# Patient Record
Sex: FEMALE | Race: ASIAN | Employment: FULL TIME | URBAN - METROPOLITAN AREA
[De-identification: names, ages, dates, MRNs, and addresses within clinical notes are randomized per-mention and may not be internally consistent; named-entity substitution may affect disease eponyms.]

---

## 2017-08-07 ENCOUNTER — HOSPITAL ENCOUNTER (OUTPATIENT)
Dept: PREADMISSION TESTING | Age: 42
Discharge: HOME | End: 2017-08-07
Payer: SELF-PAY

## 2017-08-07 VITALS
TEMPERATURE: 98.3 F | DIASTOLIC BLOOD PRESSURE: 67 MMHG | RESPIRATION RATE: 17 BRPM | SYSTOLIC BLOOD PRESSURE: 122 MMHG | BODY MASS INDEX: 19.84 KG/M2 | HEART RATE: 53 BPM | HEIGHT: 64 IN | OXYGEN SATURATION: 100 % | WEIGHT: 116.18 LBS

## 2017-08-07 LAB
ALBUMIN SERPL BCP-MCNC: 3.8 G/DL (ref 3.5–5)
ALBUMIN/GLOB SERPL: 1.2 {RATIO} (ref 1.1–2.2)
ALP SERPL-CCNC: 70 U/L (ref 45–117)
ALT SERPL-CCNC: 46 U/L (ref 12–78)
ANION GAP BLD CALC-SCNC: 8 MMOL/L (ref 5–15)
APTT PPP: 24.3 SEC (ref 22.1–32.5)
AST SERPL W P-5'-P-CCNC: 31 U/L (ref 15–37)
ATRIAL RATE: 47 BPM
BASOPHILS # BLD AUTO: 0 K/UL (ref 0–0.1)
BASOPHILS # BLD: 1 % (ref 0–1)
BILIRUB SERPL-MCNC: 0.3 MG/DL (ref 0.2–1)
BUN SERPL-MCNC: 10 MG/DL (ref 6–20)
BUN/CREAT SERPL: 14 (ref 12–20)
CALCIUM SERPL-MCNC: 8.5 MG/DL (ref 8.5–10.1)
CALCULATED P AXIS, ECG09: 51 DEGREES
CALCULATED R AXIS, ECG10: 41 DEGREES
CALCULATED T AXIS, ECG11: 41 DEGREES
CHLORIDE SERPL-SCNC: 103 MMOL/L (ref 97–108)
CO2 SERPL-SCNC: 30 MMOL/L (ref 21–32)
CREAT SERPL-MCNC: 0.7 MG/DL (ref 0.55–1.02)
DIAGNOSIS, 93000: NORMAL
EOSINOPHIL # BLD: 0.1 K/UL (ref 0–0.4)
EOSINOPHIL NFR BLD: 2 % (ref 0–7)
ERYTHROCYTE [DISTWIDTH] IN BLOOD BY AUTOMATED COUNT: 14.1 % (ref 11.5–14.5)
GLOBULIN SER CALC-MCNC: 3.3 G/DL (ref 2–4)
GLUCOSE SERPL-MCNC: 104 MG/DL (ref 65–100)
HCT VFR BLD AUTO: 34.5 % (ref 35–47)
HGB BLD-MCNC: 10.8 G/DL (ref 11.5–16)
INR PPP: 1 (ref 0.9–1.1)
LYMPHOCYTES # BLD AUTO: 36 % (ref 12–49)
LYMPHOCYTES # BLD: 2.4 K/UL (ref 0.8–3.5)
MCH RBC QN AUTO: 26.1 PG (ref 26–34)
MCHC RBC AUTO-ENTMCNC: 31.3 G/DL (ref 30–36.5)
MCV RBC AUTO: 83.3 FL (ref 80–99)
MONOCYTES # BLD: 0.4 K/UL (ref 0–1)
MONOCYTES NFR BLD AUTO: 6 % (ref 5–13)
NEUTS SEG # BLD: 3.7 K/UL (ref 1.8–8)
NEUTS SEG NFR BLD AUTO: 55 % (ref 32–75)
P-R INTERVAL, ECG05: 114 MS
PLATELET # BLD AUTO: 203 K/UL (ref 150–400)
POTASSIUM SERPL-SCNC: 4.3 MMOL/L (ref 3.5–5.1)
PROT SERPL-MCNC: 7.1 G/DL (ref 6.4–8.2)
PROTHROMBIN TIME: 9.9 SEC (ref 9–11.1)
Q-T INTERVAL, ECG07: 456 MS
QRS DURATION, ECG06: 82 MS
QTC CALCULATION (BEZET), ECG08: 403 MS
RBC # BLD AUTO: 4.14 M/UL (ref 3.8–5.2)
SODIUM SERPL-SCNC: 141 MMOL/L (ref 136–145)
THERAPEUTIC RANGE,PTTT: NORMAL SECS (ref 58–77)
VENTRICULAR RATE, ECG03: 47 BPM
WBC # BLD AUTO: 6.6 K/UL (ref 3.6–11)

## 2017-08-07 PROCEDURE — 80053 COMPREHEN METABOLIC PANEL: CPT | Performed by: SURGERY

## 2017-08-07 PROCEDURE — 85730 THROMBOPLASTIN TIME PARTIAL: CPT | Performed by: SURGERY

## 2017-08-07 PROCEDURE — 85025 COMPLETE CBC W/AUTO DIFF WBC: CPT | Performed by: SURGERY

## 2017-08-07 PROCEDURE — 85610 PROTHROMBIN TIME: CPT | Performed by: SURGERY

## 2017-08-07 PROCEDURE — 36415 COLL VENOUS BLD VENIPUNCTURE: CPT | Performed by: SURGERY

## 2017-08-07 PROCEDURE — 93005 ELECTROCARDIOGRAM TRACING: CPT

## 2017-08-07 RX ORDER — IBUPROFEN 200 MG
200 TABLET ORAL
COMMUNITY
End: 2017-08-24

## 2017-08-07 NOTE — PERIOP NOTES
[facility]  PREOPERATIVE INSTRUCTIONS    Surgery Date:   Thursday, 8/24/17. Surgery arrival time given by surgeon: NO  (If [facility] staff will call you between 3pm - 7pm the day before surgery with your arrival time. If your surgery is on a Monday, we will call you the preceding Friday. Please call [phone] after 7pm if you did not receive your arrival time.) Verification phone call on Wednesday, 8/23/17. 1. Report  to the [address] [facility] on the day of your surgery. Bring your insurance card, photo identification, and any copayment (if applicable). 2. You must have a responsible adult to drive you home and stay with you the first 24 hours after surgery if you are going home the same day of your surgery. 3. Nothing to eat or drink after midnight the night before surgery. This means NO water, gum, mints, coffee, juice, etc.    4. MEDICATIONS TO TAKE THE MORNING OF SURGERY WITH A SIP OF WATER:None. 5. No alcoholic beverages 24 hours before and after your surgery. 6. If you are being admitted to the hospital,please leave personal belongings/luggage in your car until you have an assigned hospital room number. ( The hospital discharge time is 12 PM NOON. Your adult  should be at the hospital prior to the noon discharge time unless otherwise instructed.)   7. STOP Aspirin and/or any non-steroidal anti-inflammatory drugs (i.e. Ibuprofen, Naproxen, Advil, Aleve) as directed by your surgeon. You may take Tylenol. Stop herbal supplements 1 week prior to  surgery. 8. If you are currently taking Plavix, Coumadin,or any other blood-thinning/ anticoagulant medication contact your surgeon for instructions. 9. Wear comfortable clothes. Wear your glasses instead of contacts. Please leave all money, jewelry and valuables at home. No make up, particularly mascara, the day of surgery. 10.  REMOVE ALL body piercings, rings,and jewelry and leave at home.   Wear your hair loose or down, no pony-tails, buns, or any metal hair clips. 11. If you shower the morning of surgery, please do not apply any lotions, powders, or deodorants afterwards. Do not shave any body area within 24 hours of your surgery. 12. Please follow all instructions to avoid any potential surgical cancellation. 13. Should your physical condition change, (i.e. fever, cold, flu, etc.) please notify your surgeon as soon as possible. 14. It is important to be on time. If a situation occurs where you may be delayed, please call:  [phone] / [phone] on the day of surgery. 15. The [facility] staff can be reached at [phone]. 16. Special instructions: Free  parking. Bring completed medication form on day of surgery. 17. The patient was contacted  in person. She  verbalize  understanding of all instructions does not  need reinforcement.

## 2017-08-22 NOTE — H&P
Surgery History and Physical    Subjective:      Cayetano Primrose is a 36 y.o. female who presents for bilateral breast augmentation. No past medical history on file. Past Surgical History:   Procedure Laterality Date    HX APPENDECTOMY  2004    HX TUBAL LIGATION  2001      No family history on file. Social History   Substance Use Topics    Smoking status: Never Smoker    Smokeless tobacco: Never Used    Alcohol use 0.6 oz/week     1 Glasses of wine per week      Comment: socially      Prior to Admission medications    Medication Sig Start Date End Date Taking? Authorizing Provider   ibuprofen (ADVIL) 200 mg tablet Take 200 mg by mouth every eight (8) hours as needed for Pain. Historical Provider      No Known Allergies    Review of Systems:  Cardiovascular: negative  Gastrointestinal: negative  Integument/Breast: negative    Objective:      No data found. No data recorded. Physical Exam:  GENERAL: alert, cooperative, no distress, appears stated age  LUNG: clear to auscultation bilaterally  HEART: regular rate and rhythm, S1, S2 normal, no murmur, click, rub or gallop  ABDOMEN: soft, non-tender. Bowel sounds normal. No masses,  no organomegaly  NEUROLOGIC: negative    Assessment: This is a 37 yo female who presents for bilateral breast augmentation. Plan:     Discussed the risk of surgery including bleeding and infection,  and the risks of general anesthetic. The patient understands the risks; any and all questions were answered to the patient's satisfaction.     Signed By: Issac Stanley PA-C     August 22, 2017

## 2017-08-23 ENCOUNTER — ANESTHESIA EVENT (OUTPATIENT)
Dept: SURGERY | Age: 42
End: 2017-08-23
Payer: SELF-PAY

## 2017-08-24 ENCOUNTER — HOSPITAL ENCOUNTER (OUTPATIENT)
Age: 42
Setting detail: OUTPATIENT SURGERY
Discharge: HOME | End: 2017-08-24
Attending: SURGERY | Admitting: SURGERY
Payer: SELF-PAY

## 2017-08-24 ENCOUNTER — ANESTHESIA (OUTPATIENT)
Dept: SURGERY | Age: 42
End: 2017-08-24
Payer: SELF-PAY

## 2017-08-24 VITALS
TEMPERATURE: 98 F | DIASTOLIC BLOOD PRESSURE: 74 MMHG | RESPIRATION RATE: 15 BRPM | SYSTOLIC BLOOD PRESSURE: 118 MMHG | OXYGEN SATURATION: 99 % | HEART RATE: 56 BPM

## 2017-08-24 LAB — HCG UR QL: NEGATIVE

## 2017-08-24 PROCEDURE — 77030010507 HC ADH SKN DERMBND J&J -B: Performed by: SURGERY

## 2017-08-24 PROCEDURE — 77030016570 HC BLNKT BAIR HGGR 3M -B: Performed by: SURGERY

## 2017-08-24 PROCEDURE — 81025 URINE PREGNANCY TEST: CPT

## 2017-08-24 PROCEDURE — 74011250636 HC RX REV CODE- 250/636: Performed by: ANESTHESIOLOGY

## 2017-08-24 PROCEDURE — 77030018836 HC SOL IRR NACL ICUM -A: Performed by: SURGERY

## 2017-08-24 PROCEDURE — 76210000006 HC OR PH I REC 0.5 TO 1 HR: Performed by: SURGERY

## 2017-08-24 PROCEDURE — 77030002966 HC SUT PDS J&J -A: Performed by: SURGERY

## 2017-08-24 PROCEDURE — 77030008684 HC TU ET CUF COVD -B: Performed by: NURSE ANESTHETIST, CERTIFIED REGISTERED

## 2017-08-24 PROCEDURE — 76010000153 HC OR TIME 1.5 TO 2 HR: Performed by: SURGERY

## 2017-08-24 PROCEDURE — 76210000021 HC REC RM PH II 0.5 TO 1 HR: Performed by: SURGERY

## 2017-08-24 PROCEDURE — 74011250636 HC RX REV CODE- 250/636: Performed by: SURGERY

## 2017-08-24 PROCEDURE — 77030011264 HC ELECTRD BLD EXT COVD -A: Performed by: SURGERY

## 2017-08-24 PROCEDURE — 76060000034 HC ANESTHESIA 1.5 TO 2 HR: Performed by: SURGERY

## 2017-08-24 PROCEDURE — 77030032490 HC SLV COMPR SCD KNE COVD -B: Performed by: SURGERY

## 2017-08-24 PROCEDURE — 77030026438 HC STYL ET INTUB CARD -A: Performed by: NURSE ANESTHETIST, CERTIFIED REGISTERED

## 2017-08-24 PROCEDURE — 74011000272 HC RX REV CODE- 272: Performed by: SURGERY

## 2017-08-24 PROCEDURE — 77030008463 HC STPLR SKN PROX J&J -B: Performed by: SURGERY

## 2017-08-24 PROCEDURE — 77030020782 HC GWN BAIR PAWS FLX 3M -B

## 2017-08-24 PROCEDURE — 74011250636 HC RX REV CODE- 250/636

## 2017-08-24 PROCEDURE — 77030011283 HC ELECTRD NDL COVD -A: Performed by: SURGERY

## 2017-08-24 PROCEDURE — 77030019908 HC STETH ESOPH SIMS -A: Performed by: NURSE ANESTHETIST, CERTIFIED REGISTERED

## 2017-08-24 PROCEDURE — 74011000258 HC RX REV CODE- 258: Performed by: SURGERY

## 2017-08-24 PROCEDURE — 74011000250 HC RX REV CODE- 250

## 2017-08-24 PROCEDURE — 74011000250 HC RX REV CODE- 250: Performed by: SURGERY

## 2017-08-24 DEVICE — IMPLANTABLE DEVICE: Type: IMPLANTABLE DEVICE | Site: BREAST | Status: FUNCTIONAL

## 2017-08-24 DEVICE — IMPLANT BRST 345CC P4.2CM DIA12.5CM COHESIVE SIL GEL SMOOTH: Type: IMPLANTABLE DEVICE | Site: BREAST | Status: FUNCTIONAL

## 2017-08-24 RX ORDER — SODIUM CHLORIDE 0.9 % (FLUSH) 0.9 %
5-10 SYRINGE (ML) INJECTION AS NEEDED
Status: DISCONTINUED | OUTPATIENT
Start: 2017-08-24 | End: 2017-08-24 | Stop reason: HOSPADM

## 2017-08-24 RX ORDER — ONDANSETRON 2 MG/ML
INJECTION INTRAMUSCULAR; INTRAVENOUS AS NEEDED
Status: DISCONTINUED | OUTPATIENT
Start: 2017-08-24 | End: 2017-08-24 | Stop reason: HOSPADM

## 2017-08-24 RX ORDER — AMOXICILLIN AND CLAVULANATE POTASSIUM 875; 125 MG/1; MG/1
1 TABLET, FILM COATED ORAL 2 TIMES DAILY
Qty: 14 TAB | Refills: 0 | Status: SHIPPED | OUTPATIENT
Start: 2017-08-24

## 2017-08-24 RX ORDER — NEOSTIGMINE METHYLSULFATE 1 MG/ML
INJECTION INTRAVENOUS AS NEEDED
Status: DISCONTINUED | OUTPATIENT
Start: 2017-08-24 | End: 2017-08-24 | Stop reason: HOSPADM

## 2017-08-24 RX ORDER — GLYCOPYRROLATE 0.2 MG/ML
INJECTION INTRAMUSCULAR; INTRAVENOUS AS NEEDED
Status: DISCONTINUED | OUTPATIENT
Start: 2017-08-24 | End: 2017-08-24 | Stop reason: HOSPADM

## 2017-08-24 RX ORDER — ONDANSETRON 8 MG/1
8 TABLET, ORALLY DISINTEGRATING ORAL
Qty: 10 TAB | Refills: 0 | Status: SHIPPED | OUTPATIENT
Start: 2017-08-24

## 2017-08-24 RX ORDER — HYDROMORPHONE HYDROCHLORIDE 1 MG/ML
.5-1 INJECTION, SOLUTION INTRAMUSCULAR; INTRAVENOUS; SUBCUTANEOUS
Status: DISCONTINUED | OUTPATIENT
Start: 2017-08-24 | End: 2017-08-24 | Stop reason: HOSPADM

## 2017-08-24 RX ORDER — CEFAZOLIN SODIUM IN 0.9 % NACL 2 G/50 ML
2 INTRAVENOUS SOLUTION, PIGGYBACK (ML) INTRAVENOUS ONCE
Status: COMPLETED | OUTPATIENT
Start: 2017-08-24 | End: 2017-08-24

## 2017-08-24 RX ORDER — DEXAMETHASONE SODIUM PHOSPHATE 4 MG/ML
INJECTION, SOLUTION INTRA-ARTICULAR; INTRALESIONAL; INTRAMUSCULAR; INTRAVENOUS; SOFT TISSUE AS NEEDED
Status: DISCONTINUED | OUTPATIENT
Start: 2017-08-24 | End: 2017-08-24 | Stop reason: HOSPADM

## 2017-08-24 RX ORDER — PROPOFOL 10 MG/ML
INJECTION, EMULSION INTRAVENOUS AS NEEDED
Status: DISCONTINUED | OUTPATIENT
Start: 2017-08-24 | End: 2017-08-24 | Stop reason: HOSPADM

## 2017-08-24 RX ORDER — ROCURONIUM BROMIDE 10 MG/ML
INJECTION, SOLUTION INTRAVENOUS AS NEEDED
Status: DISCONTINUED | OUTPATIENT
Start: 2017-08-24 | End: 2017-08-24 | Stop reason: HOSPADM

## 2017-08-24 RX ORDER — LIDOCAINE HYDROCHLORIDE 20 MG/ML
INJECTION, SOLUTION EPIDURAL; INFILTRATION; INTRACAUDAL; PERINEURAL AS NEEDED
Status: DISCONTINUED | OUTPATIENT
Start: 2017-08-24 | End: 2017-08-24 | Stop reason: HOSPADM

## 2017-08-24 RX ORDER — SODIUM CHLORIDE, SODIUM LACTATE, POTASSIUM CHLORIDE, CALCIUM CHLORIDE 600; 310; 30; 20 MG/100ML; MG/100ML; MG/100ML; MG/100ML
125 INJECTION, SOLUTION INTRAVENOUS CONTINUOUS
Status: DISCONTINUED | OUTPATIENT
Start: 2017-08-24 | End: 2017-08-24 | Stop reason: HOSPADM

## 2017-08-24 RX ORDER — ONDANSETRON 2 MG/ML
4 INJECTION INTRAMUSCULAR; INTRAVENOUS AS NEEDED
Status: DISCONTINUED | OUTPATIENT
Start: 2017-08-24 | End: 2017-08-24 | Stop reason: HOSPADM

## 2017-08-24 RX ORDER — DIAZEPAM 5 MG/1
5 TABLET ORAL
Qty: 30 TAB | Refills: 0 | Status: SHIPPED | OUTPATIENT
Start: 2017-08-24

## 2017-08-24 RX ORDER — MIDAZOLAM HYDROCHLORIDE 1 MG/ML
INJECTION, SOLUTION INTRAMUSCULAR; INTRAVENOUS AS NEEDED
Status: DISCONTINUED | OUTPATIENT
Start: 2017-08-24 | End: 2017-08-24 | Stop reason: HOSPADM

## 2017-08-24 RX ORDER — LIDOCAINE HYDROCHLORIDE 10 MG/ML
0.1 INJECTION, SOLUTION EPIDURAL; INFILTRATION; INTRACAUDAL; PERINEURAL AS NEEDED
Status: DISCONTINUED | OUTPATIENT
Start: 2017-08-24 | End: 2017-08-24 | Stop reason: HOSPADM

## 2017-08-24 RX ORDER — BUPIVACAINE HYDROCHLORIDE AND EPINEPHRINE 5; 5 MG/ML; UG/ML
INJECTION, SOLUTION EPIDURAL; INTRACAUDAL; PERINEURAL AS NEEDED
Status: DISCONTINUED | OUTPATIENT
Start: 2017-08-24 | End: 2017-08-24 | Stop reason: HOSPADM

## 2017-08-24 RX ORDER — SODIUM CHLORIDE 0.9 % (FLUSH) 0.9 %
5-10 SYRINGE (ML) INJECTION EVERY 8 HOURS
Status: DISCONTINUED | OUTPATIENT
Start: 2017-08-24 | End: 2017-08-24 | Stop reason: HOSPADM

## 2017-08-24 RX ORDER — OXYCODONE HYDROCHLORIDE 5 MG/1
5 TABLET ORAL
Qty: 35 TAB | Refills: 0 | Status: SHIPPED | OUTPATIENT
Start: 2017-08-24

## 2017-08-24 RX ORDER — SUCCINYLCHOLINE CHLORIDE 20 MG/ML
INJECTION INTRAMUSCULAR; INTRAVENOUS AS NEEDED
Status: DISCONTINUED | OUTPATIENT
Start: 2017-08-24 | End: 2017-08-24 | Stop reason: HOSPADM

## 2017-08-24 RX ORDER — FENTANYL CITRATE 50 UG/ML
INJECTION, SOLUTION INTRAMUSCULAR; INTRAVENOUS AS NEEDED
Status: DISCONTINUED | OUTPATIENT
Start: 2017-08-24 | End: 2017-08-24 | Stop reason: HOSPADM

## 2017-08-24 RX ADMIN — FENTANYL CITRATE 75 MCG: 50 INJECTION, SOLUTION INTRAMUSCULAR; INTRAVENOUS at 08:08

## 2017-08-24 RX ADMIN — CEFAZOLIN 2 G: 1 INJECTION, POWDER, FOR SOLUTION INTRAMUSCULAR; INTRAVENOUS; PARENTERAL at 08:15

## 2017-08-24 RX ADMIN — HYDROMORPHONE HYDROCHLORIDE 0.5 MG: 1 INJECTION, SOLUTION INTRAMUSCULAR; INTRAVENOUS; SUBCUTANEOUS at 09:59

## 2017-08-24 RX ADMIN — HYDROMORPHONE HYDROCHLORIDE 0.5 MG: 1 INJECTION, SOLUTION INTRAMUSCULAR; INTRAVENOUS; SUBCUTANEOUS at 10:12

## 2017-08-24 RX ADMIN — LIDOCAINE HYDROCHLORIDE 40 MG: 20 INJECTION, SOLUTION EPIDURAL; INFILTRATION; INTRACAUDAL; PERINEURAL at 08:08

## 2017-08-24 RX ADMIN — FENTANYL CITRATE 50 MCG: 50 INJECTION, SOLUTION INTRAMUSCULAR; INTRAVENOUS at 08:49

## 2017-08-24 RX ADMIN — MIDAZOLAM HYDROCHLORIDE 1 MG: 1 INJECTION, SOLUTION INTRAMUSCULAR; INTRAVENOUS at 07:59

## 2017-08-24 RX ADMIN — ONDANSETRON 4 MG: 2 INJECTION INTRAMUSCULAR; INTRAVENOUS at 09:21

## 2017-08-24 RX ADMIN — SODIUM CHLORIDE, POTASSIUM CHLORIDE, SODIUM LACTATE AND CALCIUM CHLORIDE: 600; 310; 30; 20 INJECTION, SOLUTION INTRAVENOUS at 08:46

## 2017-08-24 RX ADMIN — ROCURONIUM BROMIDE 10 MG: 10 INJECTION, SOLUTION INTRAVENOUS at 08:41

## 2017-08-24 RX ADMIN — FENTANYL CITRATE 50 MCG: 50 INJECTION, SOLUTION INTRAMUSCULAR; INTRAVENOUS at 08:41

## 2017-08-24 RX ADMIN — PROPOFOL 120 MG: 10 INJECTION, EMULSION INTRAVENOUS at 08:08

## 2017-08-24 RX ADMIN — GLYCOPYRROLATE 0.4 MG: 0.2 INJECTION INTRAMUSCULAR; INTRAVENOUS at 09:21

## 2017-08-24 RX ADMIN — MIDAZOLAM HYDROCHLORIDE 2 MG: 1 INJECTION, SOLUTION INTRAMUSCULAR; INTRAVENOUS at 07:58

## 2017-08-24 RX ADMIN — FENTANYL CITRATE 25 MCG: 50 INJECTION, SOLUTION INTRAMUSCULAR; INTRAVENOUS at 07:59

## 2017-08-24 RX ADMIN — NEOSTIGMINE METHYLSULFATE 2 MG: 1 INJECTION INTRAVENOUS at 09:21

## 2017-08-24 RX ADMIN — DEXAMETHASONE SODIUM PHOSPHATE 8 MG: 4 INJECTION, SOLUTION INTRA-ARTICULAR; INTRALESIONAL; INTRAMUSCULAR; INTRAVENOUS; SOFT TISSUE at 08:18

## 2017-08-24 RX ADMIN — SODIUM CHLORIDE, POTASSIUM CHLORIDE, SODIUM LACTATE AND CALCIUM CHLORIDE: 600; 310; 30; 20 INJECTION, SOLUTION INTRAVENOUS at 07:00

## 2017-08-24 RX ADMIN — SUCCINYLCHOLINE CHLORIDE 80 MG: 20 INJECTION INTRAMUSCULAR; INTRAVENOUS at 08:09

## 2017-08-24 RX ADMIN — ROCURONIUM BROMIDE 5 MG: 10 INJECTION, SOLUTION INTRAVENOUS at 08:08

## 2017-08-24 NOTE — IP AVS SNAPSHOT
Summary of Care Report The Summary of Care report has been created to help improve care coordination. Users with access to [employer] or [address] (Web-based application) may access additional patient information including the Discharge Summary. If you are not currently a [address] user and need more information, please call the number listed below in the [place] [address] section and ask to be connected with Medical Records. Facility Information Name Address Phone [address] [id number] [phone] Patient Information Patient Name Sex  Chris Hurley ([id number]) Female 1975 Discharge Information Admitting Provider Service Area Unit Gaye Briceno MD / [facility] / [phone] Discharge Provider Discharge Date/Time Discharge Disposition Destination (none) 2017 (Pending) AHR (none) Patient Language Language ENGLISH [13] You are allergic to the following No active allergies Current Discharge Medication List  
  
START taking these medications Dose & Instructions Dispensing Information Comments  
 amoxicillin-clavulanate 875-125 mg per tablet Commonly known as:  AUGMENTIN Dose:  1 Tab Take 1 Tab by mouth two (2) times a day. Quantity:  14 Tab Refills:  0  
   
 diazePAM 5 mg tablet Commonly known as:  VALIUM Dose:  5 mg Take 1 Tab by mouth every six (6) hours as needed for Anxiety or Sleep. Max Daily Amount: 20 mg.  
 Quantity:  30 Tab Refills:  0  
   
 ondansetron 8 mg disintegrating tablet Commonly known as:  ZOFRAN ODT Dose:  8 mg Take 1 Tab by mouth every eight (8) hours as needed for Nausea. Quantity:  10 Tab Refills:  0  
   
 oxyCODONE IR 5 mg immediate release tablet Commonly known as:  Eleonora Duncan  
 Dose:  5 mg Take 1 Tab by mouth every four (4) hours as needed for Pain. Max Daily Amount: 30 mg.  
 Quantity:  35 Tab Refills:  0 STOP taking these medications Comments ADVIL 200 mg tablet Generic drug:  ibuprofen Surgery Information ID Date/Time Status Primary Surgeon All Procedures Location [id number] 8/24/2017 0800 Unposted Radhika Mccoy MD BILATERAL BREAST AUGMENTATION [facility] Follow-up Information Follow up With Details Comments Contact Info None   None (395) Patient stated that they have no PCP Discharge Instructions Breast Augmentation Patient Instructions Please come to the office tomorrow (Friday) at 10:30 am for a postoperative check before the weekend. Please leave your surgical dressings intact Immediately Following Surgery: After surgery you will rest quietly for the first 48 hours. You will be able to walk around the house and perform light daily activities; however, during this time, it is not at all unusual for you to feel some pain, soreness and pressure in the chest area. This will gradually subside and Dr. Aline Goldstein will give you pain medication to relieve it. You must take the entire prescription of antibiotics. Be sure to lie on your back whenever you rest or sleep. Keep your head elevated for 72 hours after surgery as this will help with swelling. The surgery bra that you have been put in should be worn constantly during the day and at night. Dr. Aline Goldstein will see you in the office the day after surgery to check your progress. You will then be allowed to shower two days after surgery and change the bra as needed. When taking a shower, remove the bra and take off the gauze. The small white tapes that are under the gauze directly over your incision should be left on.   Wash yourself everywhere, including the tapes and your incisions. Use mild soap and pat yourself dry and put the bra back on. You dont need to cover the small white tapes over your incision. This daily routine will help keep the incisions clean, and will promote wound healing. Do not submerge yourself in a bath, swimming pool, or whirlpool for two weeks. You will wear the sports bra for a total of two to three weeks after surgery. The small tape strips covering your incisions. These will remain in place for seven days and will peel off by themselves. A few patients react to the anesthetic after surgery with nausea and vomiting. This usually lasts less than 24 hours and should be treated with lots of fluids. Dr. Debbie Feldman will prescribe nausea medicine for during your preoperative visit. The maximum swelling occurs at about three days and then begins to dramatically improve. Mild bruising typically resolves within 14 days. You should plan to be off work for up to five to seven days, although this can vary from person to person. Additional Post-Operative Instructions: No heavy exercise or lifting for three weeks following surgery. This will allow the implants to remain in the proper position without movement. For the first three days following surgery you should try to restrict your arm movements. Move your arms slowly and avoid sudden jerky movements of the chest and breast area. Keep your arms as close to your body as possible. This allows the implants to remain in place. Dr. Debbie Feldman encourages walking immediately after surgery. This activity will greatly minimize the risk of blood clots in your leg veins. Do not expose your breasts to the sun for eight weeks after surgery. Please notify Dr. Debbie Feldman if: 
? If your one breast becomes significantly larger than the other ? If you develop significant bruising across the chest  
? If you experience a significant increase in pain in the breast after the pain has improved ? If you develop a temperature above 101.5° F 
? If you develop redness (like a sunburn) around your incisions If you need help or have any questions feel free to call Dr Gilberto Domingo with your concerns. Dr. Gilberto Domingo is on call 24 hours per day, seven days per week and has an answering service to forward calls. Breast Massage Following Breast Augmentation You will be taught how to perform the breast massage exercises during your post operative visits. The purpose of these exercises is to keep the scar tissue that forms around implants as soft as possible. By performing these exercises as described, you can help soften the internal scar, minimize the risk of significant capsular contracture and maximize the likelihood of a soft, natural feel and appearance to your breast. 
 
Begin doing the exercises two weeks after surgery. Dr. Gilberto Domingo will show you the technique during your second post-operative visit. It is important to remember that slow steady massage is more effective than quick jerky movements. Don't worry about injuring the implant; you cannot cause a rupture with these exercises. ? Press the breasts slowly and maximally inwards (towards your breast bone) and hold for 10 seconds, then release. Repeat four times. ? Press the breasts apart slowly and maximally outwards and hold for 10 seconds, then release. Repeat four times. ? Repeat for downward movement. ? Repeat for upward movement. ? Perform these exercises four times per day for the first three months. The quality of your cosmetic enhancement may be compromised if you fail to return for any scheduled post-op visits, or follow the pre- and post-operative instructions. Please dont hesitate to report any unusual or concerning changes. Our number is [phone]. DISCHARGE SUMMARY from your Nurse The following personal items collected during your admission are returned to you:  
Dental Appliance: Dental Appliances: None Vision: Visual Aid: None Hearing Aid:   
Jewelry: Jewelry: None Clothing: Clothing: Footwear, Dress, Undergarments, Socks Other Valuables: Other Valuables: Purse (given to son) Valuables sent to safe:   
 
PATIENT INSTRUCTIONS: 
 
After general anesthesia or intravenous sedation, for 24 hours or while taking prescription Narcotics: · Limit your activities · Do not drive and operate hazardous machinery · Do not make important personal or business decisions · Do  not drink alcoholic beverages · If you have not urinated within 8 hours after discharge, please contact your surgeon on call. Report the following to your surgeon: 
· Excessive pain, swelling, redness or odor of or around the surgical area · Temperature over 100.5 · Nausea and vomiting lasting longer than 4 hours or if unable to take medications · Any signs of decreased circulation or nerve impairment to extremity: change in color, persistent  numbness, tingling, coldness or increase pain · Any questions [address] Breathing deep and coughing are very important exercises to do after surgery. Deep breathing and coughing open the little air tubes and air sacks in your lungs. You take deep breaths every day. You may not even notice - it is just something you do when you sigh or yawn. It is a natural exercise you do to keep these air passages open. After surgery, take deep breaths and cough, on purpose. Coughing and deep breathing help prevent bronchitis and pneumonia after surgery. If you had chest or belly surgery, use a pillow as a \"hug buddy\" and hold it tightly to your chest or belly when you cough. DIRECTIONS: 
6. Take 10 to 15 slow deep breaths every hour while awake. 7. Breathe in deeply, and hold it for 2 seconds. 8. Exhale slowly through puckered lips, like blowing up a balloon. 9. After every 4th or 5th deep breath, hug your pillow to your chest or belly and give a hard, deep cough. Yes, it will probably hurt. But doing this exercise is very important part of healing after surgery. Take your pain medicine to help you do this exercise without too much pain. IF YOU HAVE BEEN DIAGNOSED WITH SLEEP APNEA, PLEASE USE YOUR SLEEP APNEA DEVICE OR CPAP MACHINE WHEN YOU INTEND TO NAP AFTER TAKING PAIN MEDICATION. Ankle Pumps Ankle pumps increase the circulation of oxygenated blood to your lower extremities and decrease your risk for circulation problems such as blood clots. They also stretch the muscles, tendons and ligaments in your foot and ankle, and prevent joint contracture in the ankle and foot, especially after surgeries on the legs. It is important to do ankle pump exercises regularly after surgery because immobility increases your risk for developing a blood clot. Your doctor may also have you take an Aspirin for the next few days as well. If your doctor did not ask you to take an Aspirin, consult with him before starting Aspirin therapy on your own. Slowly point your foot forward, feeling the muscles on the top of your lower leg stretch, and hold this position for 5 seconds. Next, pull your foot back toward you as far as possible, stretching the calf muscles, and hold that position for 5 seconds. Repeat with the other foot. Perform 10 repetitions every hour while awake for both ankles if possible (down and then up with the foot once is one repetition). You should feel gentle stretching of the muscles in your lower leg when doing this exercise. If you feel pain, or your range of motion is limited, don't  Push too hard. Only go the limit your joint and muscles will let you go. If you have increasing pain, progressively worsening leg warmth or swelling, STOP the exercise and call your doctor. Below is information about the medications your doctor is prescribing after your visit: 
 
 
· OXYCODONE 
· ZOFRAN 
· AUGMENTIN 
· VALIUM Chart Review Routing History No Routing History on File

## 2017-08-24 NOTE — INTERVAL H&P NOTE
H&P Update:  Maria Lovett was seen and examined. History and physical has been reviewed. The patient has been examined.  There have been no significant clinical changes since the completion of the originally dated History and Physical.    Signed By: Wes Jackson MD     August 24, 2017 7:13 AM

## 2017-08-24 NOTE — ANESTHESIA PREPROCEDURE EVALUATION
Anesthetic History   No history of anesthetic complications            Review of Systems / Medical History  Patient summary reviewed, nursing notes reviewed and pertinent labs reviewed    Pulmonary  Within defined limits                 Neuro/Psych   Within defined limits           Cardiovascular  Within defined limits                Exercise tolerance: >4 METS     GI/Hepatic/Renal  Within defined limits              Endo/Other  Within defined limits           Other Findings              Physical Exam    Airway  Mallampati: II  TM Distance: 4 - 6 cm  Neck ROM: normal range of motion   Mouth opening: Normal     Cardiovascular    Rhythm: regular  Rate: normal         Dental    Dentition: Lower dentition intact and Upper dentition intact     Pulmonary  Breath sounds clear to auscultation               Abdominal         Other Findings            Anesthetic Plan    ASA: 1  Anesthesia type: general          Induction: Intravenous  Anesthetic plan and risks discussed with: Patient

## 2017-08-24 NOTE — DISCHARGE INSTRUCTIONS
Breast Augmentation Patient Instructions    Please come to the office tomorrow (Friday) at 10:30 am for a postoperative check before the weekend. Please leave your surgical dressings intact    Immediately Following Surgery:    After surgery you will rest quietly for the first 48 hours. You will be able to walk around the house and perform light daily activities; however, during this time, it is not at all unusual for you to feel some pain, soreness and pressure in the chest area. This will gradually subside and Dr. Louis Diaz will give you pain medication to relieve it. You must take the entire prescription of antibiotics. Be sure to lie on your back whenever you rest or sleep. Keep your head elevated for 72 hours after surgery as this will help with swelling. The surgery bra that you have been put in should be worn constantly during the day and at night. Dr. Louis Diaz will see you in the office the day after surgery to check your progress. You will then be allowed to shower two days after surgery and change the bra as needed. When taking a shower, remove the bra and take off the gauze. The small white tapes that are under the gauze directly over your incision should be left on. Wash yourself everywhere, including the tapes and your incisions. Use mild soap and pat yourself dry and put the bra back on. You dont need to cover the small white tapes over your incision. This daily routine will help keep the incisions clean, and will promote wound healing. Do not submerge yourself in a bath, swimming pool, or whirlpool for two weeks. You will wear the sports bra for a total of two to three weeks after surgery. The small tape strips covering your incisions. These will remain in place for seven days and will peel off by themselves. A few patients react to the anesthetic after surgery with nausea and vomiting. This usually lasts less than 24 hours and should be treated with lots of fluids. Dr. Josi Hyde will prescribe nausea medicine for during your preoperative visit. The maximum swelling occurs at about three days and then begins to dramatically improve. Mild bruising typically resolves within 14 days. You should plan to be off work for up to five to seven days, although this can vary from person to person. Additional Post-Operative Instructions:    No heavy exercise or lifting for three weeks following surgery. This will allow the implants to remain in the proper position without movement. For the first three days following surgery you should try to restrict your arm movements. Move your arms slowly and avoid sudden jerky movements of the chest and breast area. Keep your arms as close to your body as possible. This allows the implants to remain in place. Dr. Josi Hyde encourages walking immediately after surgery. This activity will greatly minimize the risk of blood clots in your leg veins. Do not expose your breasts to the sun for eight weeks after surgery. Please notify Dr. Josi Hyde if:   If your one breast becomes significantly larger than the other   If you develop significant bruising across the chest    If you experience a significant increase in pain in the breast after the pain has improved   If you develop a temperature above 101.5° F   If you develop redness (like a sunburn) around your incisions  If you need help or have any questions feel free to call Dr Josi Hyde with your concerns. Dr. Josi Hyde is on call 24 hours per day, seven days per week and has an answering service to forward calls. Breast Massage Following Breast Augmentation   You will be taught how to perform the breast massage exercises during your post operative visits. The purpose of these exercises is to keep the scar tissue that forms around implants as soft as possible.   By performing these exercises as described, you can help soften the internal scar, minimize the risk of significant capsular contracture and maximize the likelihood of a soft, natural feel and appearance to your breast.    Begin doing the exercises two weeks after surgery. Dr. Odalis Bowers will show you the technique during your second post-operative visit. It is important to remember that slow steady massage is more effective than quick jerky movements. Don't worry about injuring the implant; you cannot cause a rupture with these exercises.  Press the breasts slowly and maximally inwards (towards your breast bone) and hold for 10 seconds, then release. Repeat four times.  Press the breasts apart slowly and maximally outwards and hold for 10 seconds, then release. Repeat four times.  Repeat for downward movement.  Repeat for upward movement.  Perform these exercises four times per day for the first three months. The quality of your cosmetic enhancement may be compromised if you fail to return for any scheduled post-op visits, or follow the pre- and post-operative instructions. Please dont hesitate to report any unusual or concerning changes. Our number is [phone]. DISCHARGE SUMMARY from your Nurse    The following personal items collected during your admission are returned to you:   Dental Appliance: Dental Appliances: None  Vision: Visual Aid: None  Hearing Aid:    Jewelry: Jewelry: None  Clothing: Clothing: Footwear, Dress, Undergarments, Socks  Other Valuables: Other Valuables: Purse (given to son)  Valuables sent to safe:      PATIENT INSTRUCTIONS:    After general anesthesia or intravenous sedation, for 24 hours or while taking prescription Narcotics:  · Limit your activities  · Do not drive and operate hazardous machinery  · Do not make important personal or business decisions  · Do  not drink alcoholic beverages  · If you have not urinated within 8 hours after discharge, please contact your surgeon on call.     Report the following to your surgeon:  · Excessive pain, swelling, redness or odor of or around the surgical area  · Temperature over 100.5  · Nausea and vomiting lasting longer than 4 hours or if unable to take medications  · Any signs of decreased circulation or nerve impairment to extremity: change in color, persistent  numbness, tingling, coldness or increase pain  · Any questions    COUGH AND DEEP BREATHE    Breathing deep and coughing are very important exercises to do after surgery. Deep breathing and coughing open the little air tubes and air sacks in your lungs. You take deep breaths every day. You may not even notice - it is just something you do when you sigh or yawn. It is a natural exercise you do to keep these air passages open. After surgery, take deep breaths and cough, on purpose. Coughing and deep breathing help prevent bronchitis and pneumonia after surgery. If you had chest or belly surgery, use a pillow as a \"hug domo\" and hold it tightly to your chest or belly when you cough. DIRECTIONS:  6. Take 10 to 15 slow deep breaths every hour while awake. 7. Breathe in deeply, and hold it for 2 seconds. 8. Exhale slowly through puckered lips, like blowing up a balloon. 9. After every 4th or 5th deep breath, hug your pillow to your chest or belly and give a hard, deep cough. Yes, it will probably hurt. But doing this exercise is very important part of healing after surgery. Take your pain medicine to help you do this exercise without too much pain. IF YOU HAVE BEEN DIAGNOSED WITH SLEEP APNEA, PLEASE USE YOUR SLEEP APNEA DEVICE OR CPAP MACHINE WHEN YOU INTEND TO NAP AFTER TAKING PAIN MEDICATION. Ankle Pumps    Ankle pumps increase the circulation of oxygenated blood to your lower extremities and decrease your risk for circulation problems such as blood clots. They also stretch the muscles, tendons and ligaments in your foot and ankle, and prevent joint contracture in the ankle and foot, especially after surgeries on the legs.     It is important to do ankle pump exercises regularly after surgery because immobility increases your risk for developing a blood clot. Your doctor may also have you take an Aspirin for the next few days as well. If your doctor did not ask you to take an Aspirin, consult with him before starting Aspirin therapy on your own. Slowly point your foot forward, feeling the muscles on the top of your lower leg stretch, and hold this position for 5 seconds. Next, pull your foot back toward you as far as possible, stretching the calf muscles, and hold that position for 5 seconds. Repeat with the other foot. Perform 10 repetitions every hour while awake for both ankles if possible (down and then up with the foot once is one repetition). You should feel gentle stretching of the muscles in your lower leg when doing this exercise. If you feel pain, or your range of motion is limited, don't  Push too hard. Only go the limit your joint and muscles will let you go. If you have increasing pain, progressively worsening leg warmth or swelling, STOP the exercise and call your doctor. Below is information about the medications your doctor is prescribing after your visit:    Other information in your discharge envelope:  []     PRESCRIPTIONS  []     PHYSICAL THERAPY PRESCRIPTION  []     APPOINTMENT CARDS  []     Regional Anesthesia Pamphlet for block or block with On-Q Catheter from Anesthesia Service  []     Medical device information sheets/pamphlets from their    []     School/work excuse note. []     /parent work excuse note. These are general instructions for a healthy lifestyle:    *  Please give a list of your current medications to your Primary Care Provider. *  Please update this list whenever your medications are discontinued, doses are      changed, or new medications (including over-the-counter products) are added.   *  Please carry medication information at all times in case of emergency situations. About Smoking  No smoking / No tobacco products / Avoid exposure to second hand smoke    Surgeon General's Warning:  Quitting smoking now greatly reduces serious risk to your health. Obesity, smoking, and sedentary lifestyle greatly increases your risk for illness and disease. A healthy diet, regular physical exercise & weight monitoring are important for maintaining a healthy lifestyle. Congestive Heart Failure  You may be retaining fluid if you have a history of heart failure or if you experience any of the following symptoms:  Weight gain of 3 pounds or more overnight or 5 pounds in a week, increased swelling in our hands or feet or shortness of breath while lying flat in bed. Please call your doctor as soon as you notice any of these symptoms; do not wait until your next office visit. Recognize signs and symptoms of STROKE:  F - face looks uneven  A - arms unable to move or move even  S - speech slurred or non-existent  T - time-call 911 as soon as signs and symptoms begin-DO NOT go         Back to bed or wait to see if you get better-TIME IS BRAIN. Warning signs of HEART ATTACK  Call 911 if you have these symptoms    · Chest discomfort. Most heart attacks involve discomfort in the center of the chest that lasts more than a few minutes, or that goes away and comes back. It can feel like uncomfortable pressure, squeezing, fullness, or pain. · Discomfort in other areas of the upper body. Symptoms can include pain or discomfort in one or both        Arms, the back, neck, jaw, or stomach. ·  Shortness of breath with or without chest discomfort. · Other signs may include breaking out in a cold sweat, nausea, or lightheadedness    Don't wait more than five minutes to call 911 - MINUTES MATTER! Fast action can save your life. Calling 911 is almost always the fastest way to get lifesaving treatment.   Emergency Medical Services staff can begin treatment when they arrive - up to an hour sooner than if someone gets to the hospital by car. RACHEAL RUTLEDGE MEDICATION AND SIDE EFFECT GUIDE    The [facility] MEDICATION AND SIDE EFFECT GUIDE was provided to the PATIENT AND CARE PROVIDER.   Information provided includes instruction about drug purpose and common side effects for the following medications:    · OXYCODONE  · ZOFRAN  · AUGMENTIN  · VALIUM

## 2017-08-24 NOTE — IP AVS SNAPSHOT
[facility] 
 
 
 [address] 
[phone] Patient: Olman Pacheco MRN: [id number] BLS:6[birth year]4/[birth year]44 You are allergic to the following No active allergies Recent Documentation OB Status Smoking Status Having regular periods Never Smoker Emergency Contacts Name Discharge Info Relation Home Work Mobile Kody Prime CAREGIVER [3] Spouse [3] [phone] About your hospitalization You were admitted on:  August 24, 2017 You last received care in the:  [facility] PACU You were discharged on:  August 24, 2017 Unit phone number:  [phone] Why you were hospitalized Your primary diagnosis was:  Not on File Providers Seen During Your Hospitalizations Provider Role Specialty Primary office phone Angie Miranda MD Attending Provider Plastic Surgery [phone] Your Primary Care Physician (PCP) Primary Care Physician Office Phone Office Fax NONE ** None ** ** None ** Follow-up Information Follow up With Details Comments Contact Info None   None ([phone]) Patient stated that they have no PCP Current Discharge Medication List  
  
START taking these medications Dose & Instructions Dispensing Information Comments Morning Noon Evening Bedtime  
 amoxicillin-clavulanate 875-125 mg per tablet Commonly known as:  AUGMENTIN Your last dose was: Your next dose is:    
   
   
 Dose:  1 Tab Take 1 Tab by mouth two (2) times a day. Quantity:  14 Tab Refills:  0  
     
   
   
   
  
 diazePAM 5 mg tablet Commonly known as:  VALIUM Your last dose was: Your next dose is:    
   
   
 Dose:  5 mg Take 1 Tab by mouth every six (6) hours as needed for Anxiety or Sleep. Max Daily Amount: 20 mg.  
 Quantity:  30 Tab Refills:  0 ondansetron 8 mg disintegrating tablet Commonly known as:  ZOFRAN ODT Your last dose was: Your next dose is:    
   
   
 Dose:  8 mg Take 1 Tab by mouth every eight (8) hours as needed for Nausea. Quantity:  10 Tab Refills:  0  
     
   
   
   
  
 oxyCODONE IR 5 mg immediate release tablet Commonly known as:  Twylla Leaks Your last dose was: Your next dose is:    
   
   
 Dose:  5 mg Take 1 Tab by mouth every four (4) hours as needed for Pain. Max Daily Amount: 30 mg.  
 Quantity:  35 Tab Refills:  0 STOP taking these medications ADVIL 200 mg tablet Generic drug:  ibuprofen Where to Get Your Medications Information on where to get these meds will be given to you by the nurse or doctor. ! Ask your nurse or doctor about these medications  
  amoxicillin-clavulanate 875-125 mg per tablet  
 diazePAM 5 mg tablet  
 ondansetron 8 mg disintegrating tablet  
 oxyCODONE IR 5 mg immediate release tablet Discharge Instructions Breast Augmentation Patient Instructions Please come to the office tomorrow (Friday) at 10:30 am for a postoperative check before the weekend. Please leave your surgical dressings intact Immediately Following Surgery: After surgery you will rest quietly for the first 48 hours. You will be able to walk around the house and perform light daily activities; however, during this time, it is not at all unusual for you to feel some pain, soreness and pressure in the chest area. This will gradually subside and Dr. Roberto Baron will give you pain medication to relieve it. You must take the entire prescription of antibiotics. Be sure to lie on your back whenever you rest or sleep. Keep your head elevated for 72 hours after surgery as this will help with swelling.  
 
The surgery bra that you have been put in should be worn constantly during the day and at night. Dr. Dawson Skelton will see you in the office the day after surgery to check your progress. You will then be allowed to shower two days after surgery and change the bra as needed. When taking a shower, remove the bra and take off the gauze. The small white tapes that are under the gauze directly over your incision should be left on. Wash yourself everywhere, including the tapes and your incisions. Use mild soap and pat yourself dry and put the bra back on. You dont need to cover the small white tapes over your incision. This daily routine will help keep the incisions clean, and will promote wound healing. Do not submerge yourself in a bath, swimming pool, or whirlpool for two weeks. You will wear the sports bra for a total of two to three weeks after surgery. The small tape strips covering your incisions. These will remain in place for seven days and will peel off by themselves. A few patients react to the anesthetic after surgery with nausea and vomiting. This usually lasts less than 24 hours and should be treated with lots of fluids. Dr. Dawson Skelton will prescribe nausea medicine for during your preoperative visit. The maximum swelling occurs at about three days and then begins to dramatically improve. Mild bruising typically resolves within 14 days. You should plan to be off work for up to five to seven days, although this can vary from person to person. Additional Post-Operative Instructions: No heavy exercise or lifting for three weeks following surgery. This will allow the implants to remain in the proper position without movement. For the first three days following surgery you should try to restrict your arm movements. Move your arms slowly and avoid sudden jerky movements of the chest and breast area. Keep your arms as close to your body as possible. This allows the implants to remain in place. Dr. Bia Hoffman encourages walking immediately after surgery. This activity will greatly minimize the risk of blood clots in your leg veins. Do not expose your breasts to the sun for eight weeks after surgery. Please notify Dr. Bia Hoffman if: 
? If your one breast becomes significantly larger than the other ? If you develop significant bruising across the chest  
? If you experience a significant increase in pain in the breast after the pain has improved ? If you develop a temperature above 101.5° F 
? If you develop redness (like a sunburn) around your incisions If you need help or have any questions feel free to call Dr Bia Hoffman with your concerns. Dr. Bia Hoffman is on call 24 hours per day, seven days per week and has an answering service to forward calls. Breast Massage Following Breast Augmentation You will be taught how to perform the breast massage exercises during your post operative visits. The purpose of these exercises is to keep the scar tissue that forms around implants as soft as possible. By performing these exercises as described, you can help soften the internal scar, minimize the risk of significant capsular contracture and maximize the likelihood of a soft, natural feel and appearance to your breast. 
 
Begin doing the exercises two weeks after surgery. Dr. Bia Hoffman will show you the technique during your second post-operative visit. It is important to remember that slow steady massage is more effective than quick jerky movements. Don't worry about injuring the implant; you cannot cause a rupture with these exercises. ? Press the breasts slowly and maximally inwards (towards your breast bone) and hold for 10 seconds, then release. Repeat four times. ? Press the breasts apart slowly and maximally outwards and hold for 10 seconds, then release. Repeat four times. ? Repeat for downward movement. ? Repeat for upward movement. ? Perform these exercises four times per day for the first three months. The quality of your cosmetic enhancement may be compromised if you fail to return for any scheduled post-op visits, or follow the pre- and post-operative instructions. Please dont hesitate to report any unusual or concerning changes. Our number is [phone]. DISCHARGE SUMMARY from your Nurse The following personal items collected during your admission are returned to you:  
Dental Appliance: Dental Appliances: None Vision: Visual Aid: None Hearing Aid:   
Jewelry: Jewelry: None Clothing: Clothing: Footwear, Dress, Undergarments, Socks Other Valuables: Other Valuables: Purse (given to son) Valuables sent to safe:   
 
PATIENT INSTRUCTIONS: 
 
After general anesthesia or intravenous sedation, for 24 hours or while taking prescription Narcotics: · Limit your activities · Do not drive and operate hazardous machinery · Do not make important personal or business decisions · Do  not drink alcoholic beverages · If you have not urinated within 8 hours after discharge, please contact your surgeon on call. Report the following to your surgeon: 
· Excessive pain, swelling, redness or odor of or around the surgical area · Temperature over 100.5 · Nausea and vomiting lasting longer than 4 hours or if unable to take medications · Any signs of decreased circulation or nerve impairment to extremity: change in color, persistent  numbness, tingling, coldness or increase pain · Any questions [address] Breathing deep and coughing are very important exercises to do after surgery. Deep breathing and coughing open the little air tubes and air sacks in your lungs. You take deep breaths every day. You may not even notice - it is just something you do when you sigh or yawn. It is a natural exercise you do to keep these air passages open. After surgery, take deep breaths and cough, on purpose. Coughing and deep breathing help prevent bronchitis and pneumonia after surgery. If you had chest or belly surgery, use a pillow as a \"hug domo\" and hold it tightly to your chest or belly when you cough. DIRECTIONS: 
6. Take 10 to 15 slow deep breaths every hour while awake. 7. Breathe in deeply, and hold it for 2 seconds. 8. Exhale slowly through puckered lips, like blowing up a balloon. 9. After every 4th or 5th deep breath, hug your pillow to your chest or belly and give a hard, deep cough. Yes, it will probably hurt. But doing this exercise is very important part of healing after surgery. Take your pain medicine to help you do this exercise without too much pain. IF YOU HAVE BEEN DIAGNOSED WITH SLEEP APNEA, PLEASE USE YOUR SLEEP APNEA DEVICE OR CPAP MACHINE WHEN YOU INTEND TO NAP AFTER TAKING PAIN MEDICATION. Ankle Pumps Ankle pumps increase the circulation of oxygenated blood to your lower extremities and decrease your risk for circulation problems such as blood clots. They also stretch the muscles, tendons and ligaments in your foot and ankle, and prevent joint contracture in the ankle and foot, especially after surgeries on the legs. It is important to do ankle pump exercises regularly after surgery because immobility increases your risk for developing a blood clot. Your doctor may also have you take an Aspirin for the next few days as well. If your doctor did not ask you to take an Aspirin, consult with him before starting Aspirin therapy on your own. Slowly point your foot forward, feeling the muscles on the top of your lower leg stretch, and hold this position for 5 seconds. Next, pull your foot back toward you as far as possible, stretching the calf muscles, and hold that position for 5 seconds. Repeat with the other foot.  
Perform 10 repetitions every hour while awake for both ankles if possible (down and then up with the foot once is one repetition). You should feel gentle stretching of the muscles in your lower leg when doing this exercise. If you feel pain, or your range of motion is limited, don't  Push too hard. Only go the limit your joint and muscles will let you go. If you have increasing pain, progressively worsening leg warmth or swelling, STOP the exercise and call your doctor. Below is information about the medications your doctor is prescribing after your visit: 
 
 
· OXYCODONE 
· ZOFRAN 
· AUGMENTIN 
· VALIUM Discharge Orders None General Information Please provide this summary of care documentation to your next provider. Patient Signature:  ____________________________________________________________ Date:  ____________________________________________________________  
  
Lavanda Ferries Provider Signature:  ____________________________________________________________ Date:  ____________________________________________________________

## 2017-08-24 NOTE — BRIEF OP NOTE
BRIEF OPERATIVE NOTE    Date of Procedure: 8/24/2017   Preoperative Diagnosis: COSMETIC  Postoperative Diagnosis: COSMETIC    Procedure(s):  BILATERAL BREAST AUGMENTATION SILICONE  Surgeon(s) and Role:     * Lamar Carmona MD - Primary         Assistant Staff:  Physician Assistant: Brett Patel PA-C    Surgical Staff:  Circ-1: Estefani Prado RN  Circ-2: Eddie Burns RN  Physician Assistant: Brett Patel PA-C  Scrub Tech-1: Lynette Dryer  Scrub Tech-Relief: [place] [employer]  Event Time In   Incision Start 9144   Incision Close 4756     Anesthesia: General   Estimated Blood Loss: 10  Specimens: * No specimens in log *   Findings: 0   Complications: 0  Implants:   Implant Name Type Inv.  Item Serial No.  Lot No. LRB No. Used Action   IMPL BRST SMTH MP GEL 345ML -- INSPIRA - [id number]  IMPL BRST SMTH MP GEL 345ML -- INSPIRA [id number] [employer] [employer] NA Left 1 Implanted   IMPL BRST SMTH MP GEL 330ML -- INSPIRA - [id number]   IMPL BRST SMTH MP GEL 330ML -- INSPIRA [id number] [employer] NA Right 1 Implanted

## 2017-08-24 NOTE — ANESTHESIA POSTPROCEDURE EVALUATION
Post-Anesthesia Evaluation and Assessment    Patient: Theresa Barr MRN: [id number]  SSN: [id number]    YOB: 1975  Age: 36 y.o. Sex: female       Cardiovascular Function/Vital Signs  Visit Vitals    /70    Pulse (!) 56    Temp 36.5 °C (97.7 °F)    Resp 14    SpO2 100%       Patient is status post general anesthesia for Procedure(s):  BILATERAL BREAST AUGMENTATION SILICONE. Nausea/Vomiting: None    Postoperative hydration reviewed and adequate. Pain:  Pain Scale 1: Numeric (0 - 10) (08/24/17 1022)  Pain Intensity 1: 5 (08/24/17 1022)   Managed    Neurological Status:   Neuro (WDL): Exceptions to WDL (08/24/17 1022)  Neuro  Neurologic State: Drowsy; Eyes open spontaneously (08/24/17 1022)  Orientation Level: Oriented to person;Oriented to place;Oriented to situation (08/24/17 1022)  Cognition: Follows commands (08/24/17 1022)  Speech: Clear (08/24/17 1022)  LUE Motor Response: Purposeful;Spontaneous  (08/24/17 1022)  LLE Motor Response: Purposeful;Spontaneous  (08/24/17 1022)  RUE Motor Response: Purposeful;Spontaneous  (08/24/17 1022)  RLE Motor Response: Purposeful;Spontaneous  (08/24/17 1022)   At baseline    Mental Status and Level of Consciousness: Arousable    Pulmonary Status:   O2 Device: Room air (08/24/17 1010)   Adequate oxygenation and airway patent    Complications related to anesthesia: None    Post-anesthesia assessment completed.  No concerns    Signed By: Annamarie Hansen MD     August 24, 2017

## 2017-08-25 NOTE — OP NOTES
[facility]   [address] [facility], [address]   OP NOTE       Name:  Domingo Taylor   MR#:  [id number]   :  1975   Account #:  [de-identified]    Surgery Date:  2017   Date of Adm:  2017       PREOPERATIVE DIAGNOSIS: Micromastia. POSTOPERATIVE DIAGNOSIS: Micromastia. PROCEDURE PERFORMED: Bilateral breast augmentation. SURGEON: John Welsh MD    ASSISTANT: Tessa Jaquez PA-C    ANESTHESIA: General endotracheal.    ESTIMATED BLOOD LOSS: Minimal.    DRAINS: None. SPECIMENS REMOVED: None. COMPLICATIONS: None. COUNTS: Correct x2. DISPOSITION: Stable to PACU.    BRIEF HISTORY: The patient is a 69-year-old female requesting   cosmetic breast enhancement. Bilateral breast augmentation via an   inframammary submuscular approach using smooth, round silicone   implants is offered. The overall procedure, incisional approach,   expected outcomes, and recovery were discussed. The risks, benefits,   and alternatives to the procedure including, but not limited to bleeding,   infection, damage to surrounding tissue structures, the need for   revisional surgery, seroma, hematoma, capsular contracture, implant   rupture, implant deflation, the need for future implant maintenance,   surveillance MRIs, partial or total loss of sensation to the nipple,   inability to breast-feed, hypertrophic scarring, and asymmetry were   discussed. Postoperative cup size cannot be guaranteed. The patient   agreed to proceed. DESCRIPTION OF PROCEDURE: Preoperative markings were made   with the patient in the standing position. Informed consent was   obtained. The patient was taken to the operating room and placed   supine on the operating table. Sequential compression boots were   placed. General endotracheal anesthesia was obtained. A lower body   Patricio Hugger was placed. The chest was prepped and draped in the   usual sterile fashion.  The preoperative markings were injected with 20   mL of 0.25% lidocaine with 1:400,000 epinephrine. Bilateral inframammary incisions 4 cm in length were designed. The   right incision was made sharply and dissection carried down through   the deep dermis and subcutaneous tissue in a superior medialward   direction, creating a stair-step incision pattern. Alex's fascia was   then transected and the inferior medial border of the pectoralis major   muscle was seen along the anterior surface of a rib. The subpectoral   space was then entered bluntly and dissected superiorly and laterally. Using the lighted retractor, the subpectoral space was explored. Hemostasis was secured. Inferior lateral border of the muscle was   released from the chest wall. Valencell, style  mL silicone sizer   was placed via a Sloan funnel and the same procedure was repeated   on the left side. Symmetry and volume were examined in the reclining   position. Small modifications to the pockets were made with blunt   dissection. It was determined intraoperatively that the right side   required a small downsizing. The sizer was replaced to 330 mL, which   optimized symmetry and volume. The patient was placed supine. The   sizers were removed. Both subpectoral pockets were reexplored. Hemostasis was secured. The pockets were irrigated with 2 L of triple   antibiotic solution followed by 10 mL of 0.5% Marcaine with   epinephrine. Gloves were changed. An Valencell, style  mL   silicone implant, serial [id number] was then presoaked in triple   antibiotic solution and placed within the right breast pocket via a Sloan   funnel having changed gloves. Orientation was confirmed. The breast   wound was closed by securing Alex's fascia to the anterior chest   wall fascia with interrupted 2-0 PDS sutures to secure the   inframammary fold.  The wound was then closed with running   subcutaneous 2-0 PDS, running deep dermal 3-0 Monocryl and a   running subcuticular 5-0 Monocryl on the skin. The same procedure   was repeated on the left with implant serial #[id number], 345 mL. The   wound was closed likewise. Symmetry and volume were satisfactory. The wounds were then dressed with Dermabond, 4 x 4's, Medipore   tape, Hornbeak foam, and a surgical bra was placed. Anesthesia was   then discontinued. The patient extubated in the operating room, having   tolerated the procedure well. The needle, instrument, and laparotomy   pad counts at the end of the case were correct.         MD SHOSHANA Lazar / CAMERON   D:  08/25/2017   08:31   T:  08/25/2017   08:53   Job #:  [id number]

## (undated) DEVICE — SOLUTION IV 1000ML 0.9% SOD CHL

## (undated) DEVICE — BLADE ELECTRODE: Brand: EDGE

## (undated) DEVICE — DRAPE,CHEST,FENES,15X10,STERIL: Brand: MEDLINE

## (undated) DEVICE — GAUZE SPONGES,12 PLY: Brand: CURITY

## (undated) DEVICE — STERILE POLYISOPRENE POWDER-FREE SURGICAL GLOVES WITH EMOLLIENT COATING: Brand: PROTEXIS

## (undated) DEVICE — TRAY PREP DRY W/ PREM GLV 2 APPL 6 SPNG 2 UNDPD 1 OVERWRAP

## (undated) DEVICE — STERILE POLYISOPRENE POWDER-FREE SURGICAL GLOVES: Brand: PROTEXIS

## (undated) DEVICE — STAPLER SKIN H3.9MM WIRE DIA0.58MM CRWN 6.9MM 35 STPL ROT

## (undated) DEVICE — INFECTION CONTROL KIT SYS

## (undated) DEVICE — DEVON™ KNEE AND BODY STRAP 60" X 3" (1.5 M X 7.6 CM): Brand: DEVON

## (undated) DEVICE — Z DISCONTINUED PER MEDLINE PACK PROCEDURE SURG PLAS

## (undated) DEVICE — COVER LT HNDL BLU PLAS

## (undated) DEVICE — 3000CC GUARDIAN II: Brand: GUARDIAN

## (undated) DEVICE — MEDI-VAC NON-CONDUCTIVE SUCTION TUBING: Brand: CARDINAL HEALTH

## (undated) DEVICE — DERMABOND SKIN ADH 0.7ML -- DERMABOND ADVANCED 12/BX

## (undated) DEVICE — DRAPE FLD WRM W44XL66IN C6L FOR INTRATEMP SYS THERMABASIN

## (undated) DEVICE — Z DISCONTINUEDSOLUTION PREP 2OZ 10% POVIDONE IOD SCR CAP BTL

## (undated) DEVICE — SOLUTION IV 250ML 0.9% SOD CHL CLR INJ FLX BG CONT PRT CLSR

## (undated) DEVICE — 3M™ BAIR HUGGER® UNDERBODY BLANKET, FULL ACCESS, 10 PER CASE 63500: Brand: BAIR HUGGER™

## (undated) DEVICE — SUTURE PDS II SZ 2-0 L27IN ABSRB VLT SH L26MM 1/2 CIR Z317H

## (undated) DEVICE — LIGHT HANDLE: Brand: DEVON

## (undated) DEVICE — ELECTRODE NDL 2.8IN COAT VALLEYLAB

## (undated) DEVICE — BANDAGE COMPR SELF ADH 5 YDX4 IN TAN STRL PREMIERPRO LF

## (undated) DEVICE — KENDALL SCD EXPRESS SLEEVES, KNEE LENGTH, MEDIUM: Brand: KENDALL SCD